# Patient Record
Sex: MALE | Race: WHITE | NOT HISPANIC OR LATINO | Employment: UNEMPLOYED | URBAN - METROPOLITAN AREA
[De-identification: names, ages, dates, MRNs, and addresses within clinical notes are randomized per-mention and may not be internally consistent; named-entity substitution may affect disease eponyms.]

---
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
## 2024-01-23 ENCOUNTER — OFFICE VISIT (OUTPATIENT)
Dept: FAMILY MEDICINE | Facility: OTHER | Age: 19
End: 2024-01-23
Attending: INTERNAL MEDICINE
Payer: COMMERCIAL

## 2024-01-23 VITALS
HEART RATE: 74 BPM | TEMPERATURE: 99.1 F | WEIGHT: 159.7 LBS | OXYGEN SATURATION: 98 % | DIASTOLIC BLOOD PRESSURE: 72 MMHG | SYSTOLIC BLOOD PRESSURE: 120 MMHG | RESPIRATION RATE: 16 BRPM

## 2024-01-23 DIAGNOSIS — R09.81 CONGESTION OF PARANASAL SINUS: ICD-10-CM

## 2024-01-23 DIAGNOSIS — J02.9 SORE THROAT: ICD-10-CM

## 2024-01-23 DIAGNOSIS — H92.03 OTALGIA, BILATERAL: ICD-10-CM

## 2024-01-23 DIAGNOSIS — R05.9 COUGH, UNSPECIFIED TYPE: ICD-10-CM

## 2024-01-23 DIAGNOSIS — J01.90 ACUTE SINUSITIS WITH SYMPTOMS > 10 DAYS: Primary | ICD-10-CM

## 2024-01-23 DIAGNOSIS — R53.83 FATIGUE, UNSPECIFIED TYPE: ICD-10-CM

## 2024-01-23 PROCEDURE — 99203 OFFICE O/P NEW LOW 30 MIN: CPT

## 2024-01-23 RX ORDER — ERYTHROMYCIN 5 MG/G
OINTMENT OPHTHALMIC
COMMUNITY
Start: 2023-09-05

## 2024-01-23 RX ORDER — AMOXICILLIN 500 MG/1
500 CAPSULE ORAL
COMMUNITY
Start: 2024-01-20 | End: 2024-01-23 | Stop reason: ALTCHOICE

## 2024-01-23 RX ORDER — DOXYCYCLINE 100 MG/1
100 CAPSULE ORAL 2 TIMES DAILY
Qty: 14 CAPSULE | Refills: 0 | Status: SHIPPED | OUTPATIENT
Start: 2024-01-23 | End: 2024-01-30

## 2024-01-23 ASSESSMENT — PAIN SCALES - GENERAL: PAINLEVEL: NO PAIN (0)

## 2024-01-23 NOTE — PROGRESS NOTES
ASSESSMENT/PLAN:    I have reviewed the nursing notes.  I have reviewed the findings, diagnosis, plan and need for follow up with the patient.    1. Sore throat  2. Fatigue, unspecified type  3. Congestion of paranasal sinus  4. Cough, unspecified type  5. Otalgia, bilateral  6. Acute sinusitis with symptoms > 10 days    - doxycycline hyclate (VIBRAMYCIN) 100 MG capsule; Take 1 capsule (100 mg) by mouth 2 times daily for 7 days  Dispense: 14 capsule; Refill: 0    - Encouraged patient to continue taking his allergy medications as he had mentioned he has been taking.    -Patient states that he had been treated for multiple ear infections as a child and believes that amoxicillin is not effective for him and has not started to feel better since he was started on the antibiotics on 1/20/2024.    - Symptomatic treatment - Encouraged fluids, salt water gargles, honey (only if greater than 1 year in age due to risk of botulism), elevation, humidifier, sinus rinse/netti pot, lozenges, tea, topical vapor rub, popsicles, rest, etc     - May use over-the-counter Tylenol or ibuprofen PRN    - Discussed warning signs/symptoms indicative of need to f/u    - Follow up if symptoms persist or worsen or concerns    - I explained my diagnostic considerations and recommendations to the patient, who voiced understanding and agreement with the treatment plan. All questions were answered. We discussed potential side effects of any prescribed or recommended therapies, as well as expectations for response to treatments.    SEGRE Castaneda CNP  1/23/2024  4:24 PM    HPI:    Harris Ford is a 18 year old male who presents to [facility] today for concerns of ongoing sinus infection.  Pt states he was seen on 1/20/24 and was started on amoxicillin 1 capsule 3 times a day for 10 days but does not believe that it is working.  Patient continues to complain of sore throat ear discomfort and sinus pressure.  Denies fever and chills since  around [place] time.  Patient tested negative for COVID strep and influenza on 120/23.     No past medical history on file.  No past surgical history on file.  Social History     Tobacco Use    Smoking status: Not on file    Smokeless tobacco: Not on file   Substance Use Topics    Alcohol use: Not on file     Current Outpatient Medications   Medication Sig Dispense Refill    amoxicillin (AMOXIL) 500 MG capsule Take 500 mg by mouth      erythromycin (ROMYCIN) 5 MG/GM ophthalmic ointment       fluticasone (FLONASE) 50 MCG/ACT nasal spray Spray 2 sprays into both nostrils daily 1 Package 6    IBUPROFEN PO Take 400 mg by mouth 3 times daily as needed for moderate pain       No Known Allergies  Past medical history, past surgical history, current medications and allergies reviewed and accurate to the best of my knowledge.      ROS:  Refer to HPI    /72   Pulse 74   Temp 99.1  F (37.3  C) (Temporal)   Resp 16   Wt 72.4 kg (159 lb 11.2 oz)   SpO2 98%     EXAM:  General Appearance: Well appearing 18 year old male, appropriate appearance for age. No acute distress   Ears: Left TM intact, translucent with bony landmarks appreciated, + erythema, no effusion, no bulging, no purulence.  Right TM intact, translucent with bony landmarks appreciated, mild erythema, no effusion, no bulging, no purulence.  Left auditory canal clear.  Right auditory canal clear.  Normal external ears, non tender.  Eyes: conjunctivae normal without erythema or irritation, corneas clear, no drainage or crusting, no eyelid swelling, pupils equal   Oropharynx: moist mucous membranes, posterior pharynx with erythema, tonsils symmetric and 2+, + erythema, no exudates or petechiae, no post nasal drip seen, no trismus, voice clear.    Sinuses:  No sinus tenderness upon palpation of the frontal or maxillary sinuses  Nose:  Bilateral nares: + erythema, no edema, congested  Neck: supple without adenopathy  Respiratory: normal chest wall and  respirations.  Normal effort.  Clear to auscultation bilaterally, no wheezing, crackles or rhonchi.  No increased work of breathing.  No cough appreciated.  Cardiac: RRR with no murmurs  Musculoskeletal:  Equal movement of bilateral upper extremities.  Equal movement of bilateral lower extremities.  Normal gait.    Neuro: Alert and oriented to person, place, and time.    Psychological: normal affect, alert, oriented, and pleasant.

## 2024-01-23 NOTE — NURSING NOTE
Patient presents to clinic for concern of continuing sinus infection   /72   Pulse 74   Temp 99.1  F (37.3  C) (Temporal)   Resp 16   Wt 72.4 kg (159 lb 11.2 oz)   SpO2 98%   Mishel Doran LPN on 1/23/2024 at 4:02 PM

## 2024-02-21 ENCOUNTER — OFFICE VISIT (OUTPATIENT)
Dept: FAMILY MEDICINE | Facility: OTHER | Age: 19
End: 2024-02-21
Attending: INTERNAL MEDICINE
Payer: COMMERCIAL

## 2024-02-21 VITALS
WEIGHT: 159 LBS | BODY MASS INDEX: 24.1 KG/M2 | HEIGHT: 68 IN | OXYGEN SATURATION: 98 % | DIASTOLIC BLOOD PRESSURE: 62 MMHG | HEART RATE: 74 BPM | SYSTOLIC BLOOD PRESSURE: 132 MMHG | TEMPERATURE: 98 F | RESPIRATION RATE: 16 BRPM

## 2024-02-21 DIAGNOSIS — J01.01 ACUTE RECURRENT MAXILLARY SINUSITIS: Primary | ICD-10-CM

## 2024-02-21 PROCEDURE — 99203 OFFICE O/P NEW LOW 30 MIN: CPT

## 2024-02-21 ASSESSMENT — PAIN SCALES - GENERAL: PAINLEVEL: MODERATE PAIN (4)

## 2024-02-21 NOTE — PROGRESS NOTES
ASSESSMENT/PLAN:    I have reviewed the nursing notes.  I have reviewed the findings, diagnosis, plan and need for follow up with the patient.    1. Acute recurrent maxillary sinusitis  - amoxicillin-clavulanate (AUGMENTIN) 875-125 MG tablet; Take 1 tablet by mouth 2 times daily for 10 days  Dispense: 20 tablet; Refill: 0  - Adult ENT  Referral; Future    Patient presents with recurrent sinus symptoms.  Patient's vitals are stable and he appears nontoxic.  Physical exam and symptoms consistent with acute recurrent maxillary sinusitis.  Will treat with Augmentin as patient was recently treated for sinus infection with doxycycline.  Advised patient to take this medication with food to reduce risk of GI upset. Discussed symptomatic treatment - Encouraged fluids, elevation, humidifier, sinus rinse/netti pot, rest, etc. May use over-the-counter Tylenol or ibuprofen PRN.  Advised patient that he can also try an over-the-counter antihistamine such as Claritin or Zyrtec and Flonase nasal spray to help the sinus symptoms.  Placed an ENT referral for patient to be further evaluated due to his recurrent sinus symptoms.    Discussed warning signs/symptoms indicative of need to f/u    Follow up if symptoms persist or worsen or concerns    I explained my diagnostic considerations and recommendations to the patient, who voiced understanding and agreement with the treatment plan. All questions were answered. We discussed potential side effects of any prescribed or recommended therapies, as well as expectations for response to treatments.    Lincoln Olivo, SERGE CNP  2/21/2024  5:14 PM    HPI:    Harris Ford is a 18 year old male  who presents to [facility] today for concerns of tooth pain and sinus symptoms     sinus infection x 5-6 days.     Symptoms:   Location: right maxillary  Nasal congestion: Yes: +  Purulent nasal discharge: Yes: +  Headache: No  Facial pain: Yes: +   Cough: No  Tooth pain/symptoms: Yes:  "+  Myalgias: No  Presence of fever: No   Halitosis: No   Anosmia: No    Metallic taste in the mouth: No     Treatments tried: ibuprofen and tylenol with minimal improvement.     History of similar symptoms: Yes: Was treated for maxillary sinusitis on 1/20/2024 in the ED with amoxicillin.  His symptoms had not improved and [facility] and treated with doxycycline.  His symptoms got better but then quickly returned 5 to 6 days ago.  Prior workup: Yes: See above    PCP: Kyle    History reviewed. No pertinent past medical history.  History reviewed. No pertinent surgical history.  Social History     Tobacco Use    Smoking status: Some Days     Types: Cigarettes    Smokeless tobacco: Not on file    Tobacco comments:     Very occasional - social   Substance Use Topics    Alcohol use: Not on file     Current Outpatient Medications   Medication Sig Dispense Refill    fluticasone (FLONASE) 50 MCG/ACT nasal spray Spray 2 sprays into both nostrils daily 1 Package 6    IBUPROFEN PO Take 400 mg by mouth 3 times daily as needed for moderate pain      erythromycin (ROMYCIN) 5 MG/GM ophthalmic ointment  (Patient not taking: Reported on 2/21/2024)       No Known Allergies  Past medical history, past surgical history, current medications and allergies reviewed and accurate to the best of my knowledge.      ROS:  Refer to HPI    /62 (BP Location: Right arm, Patient Position: Sitting, Cuff Size: Adult Regular)   Pulse 74   Temp 98  F (36.7  C) (Tympanic)   Resp 16   Ht 1.727 m (5' 8\")   Wt 72.1 kg (159 lb)   SpO2 98%   BMI 24.18 kg/m      EXAM:  General Appearance: Well appearing 18 year old male, appropriate appearance for age. No acute distress   Ears: Left TM intact, translucent with bony landmarks appreciated, no erythema, no effusion, no bulging, no purulence.  Right TM intact, translucent with bony landmarks appreciated, no erythema, no effusion, no bulging, no purulence.  Left " auditory canal clear.  Right auditory canal clear.  Normal external ears, non tender.  Eyes: conjunctivae normal without erythema or irritation, corneas clear, no drainage or crusting, no eyelid swelling, pupils equal   Oropharynx: moist mucous membranes, posterior pharynx without erythema, tonsils symmetric and 1+, no erythema, no exudates or petechiae, no post nasal drip seen, no trismus, voice clear.    Sinuses:  Sinus tenderness upon palpation of the right maxillary sinuses  Nose:  Bilateral nares: mild erythema, edema, purulent drainage and congestion   Neck: supple without adenopathy  Respiratory: normal chest wall and respirations.  Normal effort.  Clear to auscultation bilaterally, no wheezing, crackles or rhonchi.  No increased work of breathing.  No cough appreciated.  Cardiac: RRR with no murmurs  Musculoskeletal:  Equal movement of bilateral upper extremities.  Equal movement of bilateral lower extremities.  Normal gait.    Dermatological: no rashes noted of exposed skin  Neuro: Alert and oriented to person, place, and time.    Psychological: normal affect, alert, oriented, and pleasant.

## 2024-02-21 NOTE — NURSING NOTE
"Chief Complaint   Patient presents with    Sinus Problem     Ongoing - recurring      Dental Pain     X 3 days     Patient dealing with ongoing sinus issue this winter.  Tx for sinus infection earlier this year and sx returned with tooth pain and facial pain worsening.    Initial /62 (BP Location: Right arm, Patient Position: Sitting, Cuff Size: Adult Regular)   Pulse 74   Temp 98  F (36.7  C) (Tympanic)   Resp 16   Ht 1.727 m (5' 8\")   Wt 72.1 kg (159 lb)   SpO2 98%   BMI 24.18 kg/m   Estimated body mass index is 24.18 kg/m  as calculated from the following:    Height as of this encounter: 1.727 m (5' 8\").    Weight as of this encounter: 72.1 kg (159 lb).     Advance Care Directive on file? no    FOOD SECURITY SCREENING QUESTIONS:    The next two questions are to help us understand your food security.  If you are feeling you need any assistance in this area, we have resources available to support you today.    Hunger Vital Signs:  Within the past 12 months we worried whether our food would run out before we got money to buy more. Never  Within the past 12 months the food we bought just didn't last and we didn't have money to get more. Never  Julia Wright LPN,LPN on 2/21/2024 at 5:00 PM      Julia Wright LPN     "

## 2024-02-21 NOTE — PATIENT INSTRUCTIONS
Please refer to your AVS for follow up and pain/symptoms management recommendations (I.e.: medications, helpful conservative treatment modalities, appropriate follow up if need to a specialist or family practice, etc.). Please return to urgent care if your symptoms change or worsen.     Discharge instructions:  -If you were prescribed a medication(s), please take this as prescribed/directed  -Monitor your symptoms, if changing/worsening, return to UC/ER or PCP for follow up    Sinus Infection:   1. Dry out congestion with flonase (1spray in both nostrils 2x daily for 3-5 days) and pseudoephedrine (1-2 tabs every 4-6 hrs for 3-5 days) unless contraindicated     2. Antihistamine such as Claritin or Zyrtec, etc. Generic brands are OK as well.     3. Use a saline spray/Neti Pot/sinus flush (Polo Med Sinus Rinse) 2-3 times daily to irrigate sinuses/mucosal tissue. This dilutes and moves secretions.     4. Tylenol or ibuprofen for pain and fevers - alternate every 4 hours as needed. I.e.: Ibuprofen at 8am, Tylenol 12pm, Ibuprofen 4pm    -Daily maximum of Tylenol is 4000mg (recommend staying under 3000mg)   -Daily maximum of Ibuprofen is 3200 mg    5. Plenty of fluids and rest as needed.     6. Chew, yawn and speak to help eustachian tubes drain.     * If you are a smoker, try to quit *     - Consider the following over-the-counter products if you are older than 1 year and not pregnant: honey/chestal for cough relief and sambucus/elderberry for viral upper-respiratory symptoms.